# Patient Record
Sex: FEMALE | Race: WHITE | NOT HISPANIC OR LATINO | Employment: FULL TIME | URBAN - METROPOLITAN AREA
[De-identification: names, ages, dates, MRNs, and addresses within clinical notes are randomized per-mention and may not be internally consistent; named-entity substitution may affect disease eponyms.]

---

## 2019-05-31 ENCOUNTER — TRANSFERRED RECORDS (OUTPATIENT)
Dept: HEALTH INFORMATION MANAGEMENT | Facility: CLINIC | Age: 54
End: 2019-05-31

## 2019-06-01 ENCOUNTER — TRANSFERRED RECORDS (OUTPATIENT)
Dept: HEALTH INFORMATION MANAGEMENT | Facility: CLINIC | Age: 54
End: 2019-06-01

## 2019-09-26 ENCOUNTER — OFFICE VISIT (OUTPATIENT)
Dept: OPHTHALMOLOGY | Facility: CLINIC | Age: 54
End: 2019-09-26
Attending: OPHTHALMOLOGY
Payer: COMMERCIAL

## 2019-09-26 DIAGNOSIS — H43.822 VITREOMACULAR TRACTION SYNDROME OF LEFT EYE: ICD-10-CM

## 2019-09-26 DIAGNOSIS — H43.813 VITREOUS DEGENERATION OF BOTH EYES: Primary | ICD-10-CM

## 2019-09-26 DIAGNOSIS — H35.373 EPIRETINAL MEMBRANE (ERM) OF BOTH EYES: ICD-10-CM

## 2019-09-26 DIAGNOSIS — Z98.890 H/O LASER ASSISTED IN SITU KERATOMILEUSIS: ICD-10-CM

## 2019-09-26 PROCEDURE — 92134 CPTRZ OPH DX IMG PST SGM RTA: CPT | Mod: ZF | Performed by: OPHTHALMOLOGY

## 2019-09-26 PROCEDURE — G0463 HOSPITAL OUTPT CLINIC VISIT: HCPCS | Mod: ZF

## 2019-09-26 PROCEDURE — 92250 FUNDUS PHOTOGRAPHY W/I&R: CPT | Mod: ZF | Performed by: OPHTHALMOLOGY

## 2019-09-26 ASSESSMENT — CONF VISUAL FIELD
METHOD: COUNTING FINGERS
OS_NORMAL: 1
OD_NORMAL: 1

## 2019-09-26 ASSESSMENT — EXTERNAL EXAM - RIGHT EYE: OD_EXAM: NORMAL

## 2019-09-26 ASSESSMENT — VISUAL ACUITY
METHOD: SNELLEN - LINEAR
OS_SC+: +2
OD_SC: 20/20
OS_SC: 20/25

## 2019-09-26 ASSESSMENT — TONOMETRY
OS_IOP_MMHG: 14
OD_IOP_MMHG: 15
IOP_METHOD: TONOPEN

## 2019-09-26 ASSESSMENT — CUP TO DISC RATIO
OS_RATIO: 0.4
OD_RATIO: 0.3

## 2019-09-26 ASSESSMENT — SLIT LAMP EXAM - LIDS
COMMENTS: NORMAL
COMMENTS: NORMAL

## 2019-09-26 ASSESSMENT — EXTERNAL EXAM - LEFT EYE: OS_EXAM: NORMAL

## 2019-09-26 NOTE — PROGRESS NOTES
"HPI  Danie Lomeli is a 54 year old female who presents for consideration for YAG vitrelysis.  Referred by Dr. Celestin for floaters/vitreolysis in both eyes.  Had recent laser in situ keratomileusis (LASIK) 4 months ago.  She says that she is going back in for a small enhancement in her left eye.  Floaters have been present and bothersome for over a year.  Right eye has one \"obvious large floater\" that she can \"blink out of the way\".  Left eye has more smaller obscure floaters, patient describes as a veil.  She thinks the floaters have remained stable over the past year which is how long she has noticed them.  No flashing lights that accompany the floaters.    History reviewed. No pertinent past medical history.Past Ocular History: laser in situ keratomileusis (LASIK) 2019.  No trauma, or infections.  Family history: No glaucoma, macular degeneration, or retinal detachment.  Patient adopted and not completely sure.    Eye Medications:   Artificial tears s/p LASIK    Assessment & Plan     Danie Lomeli is a 54 year old female with the following diagnoses:   1. Vitreous degeneration of both eyes    2. H/O laser assisted in situ keratomileusis    3. Epiretinal membrane (ERM) of both eyes    4. Vitreomacular traction syndrome of left eye         Reviewed macular findings in both eyes.  Recommend annual monitoring with OCT for now  Right eye is a good candidate for laser vitreolysis.  Reviewed RBA, will schedule  Left eye is still evolving the Posterior vitreous detachment (PVD) and the symptomatic floater is too close to the retina/nerve to treat.  Monitor for now.  Discussed symptoms of retinal tear/detachment and the need to be seen urgently should they occur   -----------------------------------------------------------------------------------    Patient disposition:   Return for DFE RIGHT, laser day., sooner as needed.    Tye Weinstein M.D.  PGY-4, Ophthalmology       Attending Physician Attestation:  Complete " documentation of historical and exam elements from today's encounter can be found in the full encounter summary report (not reduplicated in this progress note).  I personally obtained the chief complaint(s) and history of present illness.  I confirmed and edited as necessary the review of systems, past medical/surgical history, family history, social history, and examination findings as documented by others; and I examined the patient myself.  I personally reviewed the relevant tests, images, and reports as documented above.  I formulated and edited as necessary the assessment and plan and discussed the findings and management plan with the patient and family. . Attending Physician Image/Tesing Attestation: I personally reviewed the ophthalmic test(s) associated with this encounter, agree with the interpretation(s) as documented by the resident/fellow, and have edited the corresponding report(s) as necessary.  - Tomasz Weir MD

## 2019-10-30 ENCOUNTER — OFFICE VISIT (OUTPATIENT)
Dept: OPHTHALMOLOGY | Facility: CLINIC | Age: 54
End: 2019-10-30
Attending: OPHTHALMOLOGY
Payer: COMMERCIAL

## 2019-10-30 DIAGNOSIS — H43.813 VITREOUS DEGENERATION OF BOTH EYES: Primary | ICD-10-CM

## 2019-10-30 DIAGNOSIS — H43.311 VITREOUS STRAND, RIGHT: ICD-10-CM

## 2019-10-30 PROCEDURE — 67031 LASER SURGERY EYE STRANDS: CPT | Mod: ZF | Performed by: OPHTHALMOLOGY

## 2019-10-30 PROCEDURE — G0463 HOSPITAL OUTPT CLINIC VISIT: HCPCS | Mod: 25,ZF

## 2019-10-30 ASSESSMENT — VISUAL ACUITY
OS_SC+: -2
OD_SC+: -2
METHOD: SNELLEN - LINEAR
OS_SC: 20/25
OD_SC: 20/20

## 2019-10-30 ASSESSMENT — CONF VISUAL FIELD
OS_NORMAL: 1
OD_NORMAL: 1

## 2019-10-30 ASSESSMENT — TONOMETRY
OD_IOP_MMHG: 11
OS_IOP_MMHG: 12
IOP_METHOD: TONOPEN

## 2019-10-30 NOTE — PROGRESS NOTES
Chief Complaint(s) and History of Present Illness(es)     Follow Up     Pain scale: 0/10              Comments     Follow up for laser procedure today right eye.  Supriya Michael, COA, COA 2:50 PM 10/30/2019             Review of systems for the eyes was negative other than the pertinent positives/negatives listed in the HPI.      Assessment & Plan      Danie Lomeli is a 54 year old female with the following diagnoses:   1. Vitreous degeneration of both eyes    2. Vitreous strand, right         Returns for YAG vitreolysis right eye, rba reviewed, consent obtained  Able to debulk dense Emmanuel.  Some debris migrated too close to the retina to fully treat, discussed  Discussed symptoms of retinal tear/detachment and the need to be seen urgently should they occur       Patient disposition:   Return in about 4 weeks (around 11/27/2019) for DFE.           Attending Physician Attestation:  Complete documentation of historical and exam elements from today's encounter can be found in the full encounter summary report (not reduplicated in this progress note).  I personally obtained the chief complaint(s) and history of present illness.  I confirmed and edited as necessary the review of systems, past medical/surgical history, family history, social history, and examination findings as documented by others; and I examined the patient myself.  I personally reviewed the relevant tests, images, and reports as documented above.  I formulated and edited as necessary the assessment and plan and discussed the findings and management plan with the patient and family. . - Tomasz Weir MD

## 2019-10-30 NOTE — NURSING NOTE
Chief Complaints and History of Present Illnesses   Patient presents with     Follow Up     Chief Complaint(s) and History of Present Illness(es)     Follow Up     Pain scale: 0/10              Comments     Follow up for laser procedure today right eye.  Supriya Michael, COA, COA 2:50 PM 10/30/2019

## 2019-11-20 ENCOUNTER — OFFICE VISIT (OUTPATIENT)
Dept: OPHTHALMOLOGY | Facility: CLINIC | Age: 54
End: 2019-11-20
Attending: OPHTHALMOLOGY
Payer: COMMERCIAL

## 2019-11-20 DIAGNOSIS — H43.312 VITREOUS STRAND, LEFT: ICD-10-CM

## 2019-11-20 DIAGNOSIS — H43.813 VITREOUS DEGENERATION OF BOTH EYES: Primary | ICD-10-CM

## 2019-11-20 DIAGNOSIS — H43.311 VITREOUS STRAND, RIGHT: ICD-10-CM

## 2019-11-20 PROCEDURE — G0463 HOSPITAL OUTPT CLINIC VISIT: HCPCS | Mod: ZF

## 2019-11-20 PROCEDURE — 67031 LASER SURGERY EYE STRANDS: CPT | Mod: ZF | Performed by: OPHTHALMOLOGY

## 2019-11-20 ASSESSMENT — CONF VISUAL FIELD
OD_NORMAL: 1
METHOD: COUNTING FINGERS
OS_NORMAL: 1

## 2019-11-20 ASSESSMENT — VISUAL ACUITY
METHOD: SNELLEN - LINEAR
OD_SC: 20/20
OS_SC+: -1
OD_SC+: -1
OS_SC: 20/20 SLOW

## 2019-11-20 ASSESSMENT — TONOMETRY
OD_IOP_MMHG: 12
OS_IOP_MMHG: 12
IOP_METHOD: TONOPEN

## 2019-11-20 ASSESSMENT — CUP TO DISC RATIO
OS_RATIO: 0.4
OD_RATIO: 0.3

## 2019-11-20 ASSESSMENT — SLIT LAMP EXAM - LIDS
COMMENTS: NORMAL
COMMENTS: NORMAL

## 2019-11-20 ASSESSMENT — EXTERNAL EXAM - LEFT EYE: OS_EXAM: NORMAL

## 2019-11-20 ASSESSMENT — EXTERNAL EXAM - RIGHT EYE: OD_EXAM: NORMAL

## 2019-11-20 NOTE — PROGRESS NOTES
"Chief Complaint(s) and History of Present Illness(es)     Follow Up     Laterality: both eyes    Location: central vision    Quality: shade in vision    Severity: mild    Course: stable    Associated symptoms: dryness, flashes and floaters.  Negative for eye   pain and tearing    Treatments tried: artificial tears    Response to treatment: mild improvement    Pain scale: 0/10    Comments: 3 week follow-up Vitreous degeneration of both eyes         Comments    Pt denies any significant vision changes in either eye since last visit.  Complains of a \"veil\" spot in the center of her vision LE.  Complains of occasional dryness BE.  Hx of flashes & floaters - unchanged.  Denies any pain, pressure, discharge, and tearing.  Currently using Refresh PRN BE.    Tabitha Amaya OT 1:53 PM November 20, 2019      1 small \"veil-like\" floater remains in the right eye.  Left eye floater is slightly more bothersome at this point than prior, and she thinks it might be slightly harder to look through.        Review of systems for the eyes was negative other than the pertinent positives/negatives listed in the HPI.      Assessment & Plan      Danie Lomeli is a 54 year old female with the following diagnoses:   1. Vitreous degeneration of both eyes    2. Vitreous strand, right    3. Vitreous strand, left       S/P YAG vitreolysis right eye, symptomatically improved.  Left eye with more noticeable floaters following laser in situ keratomileusis (LASIK) enhancement.  Heavy VS remains in the left eye and is more anterior than previous exam (Posterior vitreous detachment (PVD) complete)    PLAN:  - Given continual improvement of right eye since laser, will monitor right eye for now  - R/B/A of YAG vitreolysis discussed and patient would like to pursue left eye today (11/20/2019).  See procedure note.  -Discussed symptoms of retinal tear/detachment and the need to be seen urgently should they occur     Patient disposition:   Return in about " 4 weeks (around 12/18/2019) for DFE (LEFT).           Christiano Childers, PGY2  Ophthalmology Resident    Attending Physician Attestation:  Complete documentation of historical and exam elements from today's encounter can be found in the full encounter summary report (not reduplicated in this progress note).  I personally obtained the chief complaint(s) and history of present illness.  I confirmed and edited as necessary the review of systems, past medical/surgical history, family history, social history, and examination findings as documented by others; and I examined the patient myself.  I personally reviewed the relevant tests, images, and reports as documented above.  I formulated and edited as necessary the assessment and plan and discussed the findings and management plan with the patient and family. .Attending Physician Procedure Attestation: I was present for the entire procedure      - Tomasz Weir MD

## 2019-11-20 NOTE — NURSING NOTE
"Chief Complaints and History of Present Illnesses   Patient presents with     Follow Up     3 week follow-up Vitreous degeneration of both eyes          Chief Complaint(s) and History of Present Illness(es)     Follow Up     Laterality: both eyes    Location: central vision    Quality: shade in vision    Severity: mild    Course: stable    Associated symptoms: dryness, flashes and floaters.  Negative for eye pain and tearing    Treatments tried: artificial tears    Response to treatment: mild improvement    Pain scale: 0/10    Comments: 3 week follow-up Vitreous degeneration of both eyes                   Comments     Pt denies any significant vision changes in either eye since last visit.  Complains of a \"veil\" spot in the center of her vision LE.  Complains of occasional dryness BE.  Hx of flashes & floaters - unchanged.  Denies any pain, pressure, discharge, and tearing.  Currently using Refresh PRN BE.    Tabitha Amaya OT 1:53 PM November 20, 2019                   "

## 2019-12-18 ENCOUNTER — OFFICE VISIT (OUTPATIENT)
Dept: OPHTHALMOLOGY | Facility: CLINIC | Age: 54
End: 2019-12-18
Attending: OPHTHALMOLOGY
Payer: COMMERCIAL

## 2019-12-18 DIAGNOSIS — H43.312 VITREOUS STRAND, LEFT: ICD-10-CM

## 2019-12-18 DIAGNOSIS — H43.813 VITREOUS DEGENERATION OF BOTH EYES: Primary | ICD-10-CM

## 2019-12-18 DIAGNOSIS — H43.311 VITREOUS STRAND, RIGHT: ICD-10-CM

## 2019-12-18 PROCEDURE — G0463 HOSPITAL OUTPT CLINIC VISIT: HCPCS | Mod: ZF

## 2019-12-18 ASSESSMENT — VISUAL ACUITY
OS_SC: 20/20-2
METHOD: SNELLEN - LINEAR
OD_SC: 20/15

## 2019-12-18 ASSESSMENT — EXTERNAL EXAM - RIGHT EYE: OD_EXAM: NORMAL

## 2019-12-18 ASSESSMENT — CUP TO DISC RATIO
OD_RATIO: 0.3
OS_RATIO: 0.4

## 2019-12-18 ASSESSMENT — TONOMETRY
OD_IOP_MMHG: 14
OS_IOP_MMHG: 15
IOP_METHOD: TONOPEN

## 2019-12-18 ASSESSMENT — SLIT LAMP EXAM - LIDS
COMMENTS: NORMAL
COMMENTS: NORMAL

## 2019-12-18 ASSESSMENT — EXTERNAL EXAM - LEFT EYE: OS_EXAM: NORMAL

## 2019-12-18 ASSESSMENT — CONF VISUAL FIELD
OS_NORMAL: 1
METHOD: COUNTING FINGERS
OD_NORMAL: 1

## 2019-12-18 NOTE — PROGRESS NOTES
"Chief Complaint(s) and History of Present Illness(es)     Possible YAG laser vitreolysis               Comments     Pt here for possible \"clean up\" of laser YAG vitreolysis.  She is   noticing a floater in the center of her vision left eye, but both eyes are   still seeing floaters.  She has noticed in the last couple weeks that she   can also occasionally see her pulse in her left eye.  She is right eye   dominant and unsure which eye is bothering her more from the floaters.  No   eye pain.  No flashing lights.   Sharpness of her vision seems to be stable each eye.       Dayami Sims, COT 2:07 PM 12/18/2019     Review of systems for the eyes was negative other than the pertinent positives/negatives listed in the HPI.      Assessment & Plan      Danie Lomeli is a 54 year old female with the following diagnoses:   1. Vitreous degeneration of both eyes    2. Vitreous strand, right    3. Vitreous strand, left       S/P YAG vitreolysis right eye 10/30/2019   S/P YAG vitreolysis left eye 11/20/2019   Floaters are smaller in both eyes since YAG.  Noticing them less often, but still bothersome with bright backgrounds.       PLAN:  - Continue to monitor for now, will likely continue to adapt to smaller floaters.  Minimal strands that would be amenable to further YAG vit.  If patient remains strongly symptomatic in several months, could consider PPV after clear discussion of R/B/A.        Patient disposition:   Return if symptoms worsen or fail to improve.       Christiano Childers, PGY2  Ophthalmology Resident    Attending Physician Attestation:  Complete documentation of historical and exam elements from today's encounter can be found in the full encounter summary report (not reduplicated in this progress note).  I personally obtained the chief complaint(s) and history of present illness.  I confirmed and edited as necessary the review of systems, past medical/surgical history, family history, social history, and " examination findings as documented by others; and I examined the patient myself.  I personally reviewed the relevant tests, images, and reports as documented above.  I formulated and edited as necessary the assessment and plan and discussed the findings and management plan with the patient and family. . - Tomasz Weir MD

## 2020-03-11 ENCOUNTER — HEALTH MAINTENANCE LETTER (OUTPATIENT)
Age: 55
End: 2020-03-11

## 2021-01-03 ENCOUNTER — HEALTH MAINTENANCE LETTER (OUTPATIENT)
Age: 56
End: 2021-01-03

## 2021-04-25 ENCOUNTER — HEALTH MAINTENANCE LETTER (OUTPATIENT)
Age: 56
End: 2021-04-25

## 2021-10-10 ENCOUNTER — HEALTH MAINTENANCE LETTER (OUTPATIENT)
Age: 56
End: 2021-10-10

## 2022-05-22 ENCOUNTER — HEALTH MAINTENANCE LETTER (OUTPATIENT)
Age: 57
End: 2022-05-22

## 2022-09-18 ENCOUNTER — HEALTH MAINTENANCE LETTER (OUTPATIENT)
Age: 57
End: 2022-09-18

## 2023-06-04 ENCOUNTER — HEALTH MAINTENANCE LETTER (OUTPATIENT)
Age: 58
End: 2023-06-04